# Patient Record
Sex: FEMALE | Race: WHITE | Employment: OTHER | URBAN - METROPOLITAN AREA
[De-identification: names, ages, dates, MRNs, and addresses within clinical notes are randomized per-mention and may not be internally consistent; named-entity substitution may affect disease eponyms.]

---

## 2017-05-18 ENCOUNTER — HOSPITAL ENCOUNTER (OUTPATIENT)
Dept: MAMMOGRAPHY | Age: 77
Discharge: HOME | End: 2017-05-18
Attending: OBSTETRICS & GYNECOLOGY
Payer: MEDICARE

## 2017-05-18 DIAGNOSIS — Z12.31 VISIT FOR SCREENING MAMMOGRAM: ICD-10-CM

## 2017-05-18 PROCEDURE — 77067 SCR MAMMO BI INCL CAD: CPT

## 2017-05-24 ENCOUNTER — HOSPITAL ENCOUNTER (OUTPATIENT)
Dept: MAMMOGRAPHY | Age: 77
Discharge: HOME | End: 2017-05-24
Attending: OBSTETRICS & GYNECOLOGY
Payer: MEDICARE

## 2017-05-24 ENCOUNTER — HOSPITAL ENCOUNTER (OUTPATIENT)
Dept: ULTRASOUND IMAGING | Age: 77
Discharge: HOME | End: 2017-05-24
Attending: OBSTETRICS & GYNECOLOGY
Payer: MEDICARE

## 2017-05-24 DIAGNOSIS — R92.8 ABNORMAL MAMMOGRAM: ICD-10-CM

## 2017-05-24 PROCEDURE — 76642 ULTRASOUND BREAST LIMITED: CPT

## 2017-05-24 PROCEDURE — 77066 DX MAMMO INCL CAD BI: CPT

## 2018-07-20 ENCOUNTER — HOSPITAL ENCOUNTER (OUTPATIENT)
Dept: MAMMOGRAPHY | Age: 78
Discharge: HOME | End: 2018-07-20
Attending: OBSTETRICS & GYNECOLOGY
Payer: MEDICARE

## 2018-07-20 DIAGNOSIS — Z12.39 SCREENING BREAST EXAMINATION: ICD-10-CM

## 2018-07-20 PROCEDURE — 77067 SCR MAMMO BI INCL CAD: CPT

## 2019-12-10 ENCOUNTER — OFFICE VISIT (OUTPATIENT)
Dept: FAMILY MEDICINE CLINIC | Age: 79
End: 2019-12-10

## 2019-12-10 VITALS
HEART RATE: 67 BPM | OXYGEN SATURATION: 96 % | BODY MASS INDEX: 28.05 KG/M2 | DIASTOLIC BLOOD PRESSURE: 72 MMHG | TEMPERATURE: 97.9 F | SYSTOLIC BLOOD PRESSURE: 132 MMHG | HEIGHT: 62 IN | WEIGHT: 152.4 LBS | RESPIRATION RATE: 16 BRPM

## 2019-12-10 DIAGNOSIS — I10 ESSENTIAL HYPERTENSION: Primary | ICD-10-CM

## 2019-12-10 DIAGNOSIS — F02.80 ALZHEIMER'S DEMENTIA WITHOUT BEHAVIORAL DISTURBANCE, UNSPECIFIED TIMING OF DEMENTIA ONSET: ICD-10-CM

## 2019-12-10 DIAGNOSIS — G30.9 ALZHEIMER'S DEMENTIA WITHOUT BEHAVIORAL DISTURBANCE, UNSPECIFIED TIMING OF DEMENTIA ONSET: ICD-10-CM

## 2019-12-10 DIAGNOSIS — E78.2 MIXED HYPERLIPIDEMIA: ICD-10-CM

## 2019-12-10 RX ORDER — DONEPEZIL HYDROCHLORIDE 10 MG/1
TABLET, FILM COATED ORAL
COMMUNITY
Start: 2019-12-08

## 2019-12-10 RX ORDER — MEMANTINE HYDROCHLORIDE 10 MG/1
TABLET ORAL
Refills: 1 | COMMUNITY
Start: 2019-11-14

## 2019-12-10 NOTE — PROGRESS NOTES
Subjective:     Chief Complaint   Patient presents with   [address] [facility] patient        She  is a 67 y.o. female who presents for evaluation of:  New pt to [facility] care. [facility]. PMH significant for HTN, HLD, recently diagnosed Alzheimer's dementia, GERD, OA status post right TKR. Hyperlipidemia & HTN  Pt is doing well on current meds with no medication side effects noted  No new myalgias, no joint pains, no weakness  No TIA's, no chest pain on exertion, no dyspnea on exertion, no swelling of ankles. Exercising - Minimal  Dieting -no  Smoking - No     No results found for: CHOL, CHOLX, CHLST, CHOLV, HDL, LDL, LDLC, DLDLP, TGLX, TRIGL, TRIGP    ROS  Gen - no fever/chills  Resp - no dyspnea or cough  CV - no chest pain or GAXIOLA  Neuro - recently dx with Dementia. Seeing Neuro for this. Rest per HPI    Past Medical History:   Diagnosis Date    Arthritis     hands and knees    Community acquired pneumonia     Dementia ([facility] [address]     Dyspepsia and other specified disorders of function of stomach     GERD (gastroesophageal reflux disease)     Hypercholesterolemia     Hypertension      Past Surgical History:   Procedure Laterality Date    HX BACK SURGERY      HX CATARACT REMOVAL      cataract surgery    HX GYN      hysterectomy    HX GYN      2 vaginal deliveries    HX MOHS PROCEDURES      right    HX ORTHOPAEDIC  4/20/15    Right total knee arthroplasty     Current Outpatient Medications on File Prior to Visit   Medication Sig Dispense Refill    memantine (NAMENDA) 10 mg tablet TK 1 T PO BID  1    donepezil (ARICEPT) 10 mg tablet       acetaminophen (TYLENOL) 325 mg tablet Take 2 Tabs by mouth every six (6) hours. 240 Tab 0    Hydrochlorothiazide 12.5 mg tablet Take 12.5 mg by mouth every morning.  omeprazole (PRILOSEC) 20 mg capsule 20 mg every morning.  pravastatin (PRAVACHOL) 40 mg tablet Take 40 mg by mouth nightly.       [DISCONTINUED] oxyCODONE IR (ROXICODONE) 5 mg immediate release tablet Take 1-2 Tabs by mouth every three (3) hours as needed for Pain. Max Daily Amount: 80 mg. 80 Tab 0    L GASSERI/B BIFIDUM/B LONGUM (Aubrey PO) Take  by mouth.  [DISCONTINUED] multivitamin (ONE A DAY) tablet Take 1 Tab by mouth daily. No current facility-administered medications on file prior to visit. Objective:     Vitals:    12/10/19 1411   BP: 132/72   Pulse: 67   Resp: 16   Temp: 97.9 °F (36.6 °C)   TempSrc: Oral   SpO2: 96%   Weight: 152 lb 6.4 oz (69.1 kg)   Height: 5' 2\" (1.575 m)     Physical Examination:  General appearance - alert, well appearing, and in no distress  Eyes -sclera anicteric  Neck - supple, no significant adenopathy, no thyromegaly  Chest - clear to auscultation, no wheezes, rales or rhonchi, symmetric air entry  Heart - normal rate, regular rhythm, normal S1, S2, no murmurs, rubs, clicks or gallops  Neurological - alert, oriented, no focal findings or movement disorder noted  Extremitiesno edema  Psychnormal mood and affect    Assessment/ Plan:   Diagnoses and all orders for this visit:    1. Essential hypertensioncontrolled    2. Mixed hyperlipidemiahas been stable, continue pravastatin    3. Alzheimer's dementia without behavioral disturbance, unspecified timing of dementia onset (HCC)recently diagnosed by neurology. Patient's daughter reports having significant blood work and head CT done about 2 months ago. Requesting records for review and may consider checking additional labs if needed. I have discussed the diagnosis with the patient and the intended plan as seen in the above orders. The patient has received an after-visit summary and questions were answered concerning future plans. I have discussed medication side effects and warnings with the patient as well. The patient verbalizes understanding and agreement with the plan.     Follow-up and Dispositions    · Return in about 3 months (around 3/10/2020), or if symptoms worsen or fail to improve.

## 2020-03-10 ENCOUNTER — OFFICE VISIT (OUTPATIENT)
Dept: FAMILY MEDICINE CLINIC | Age: 80
End: 2020-03-10

## 2020-03-10 ENCOUNTER — HOSPITAL ENCOUNTER (OUTPATIENT)
Dept: GENERAL RADIOLOGY | Age: 80
Discharge: HOME | End: 2020-03-10
Payer: MEDICARE

## 2020-03-10 ENCOUNTER — HOSPITAL ENCOUNTER (OUTPATIENT)
Dept: LAB | Age: 80
Discharge: HOME | End: 2020-03-10
Payer: MEDICARE

## 2020-03-10 VITALS
HEIGHT: 62 IN | OXYGEN SATURATION: 95 % | TEMPERATURE: 98.8 F | DIASTOLIC BLOOD PRESSURE: 84 MMHG | HEART RATE: 67 BPM | BODY MASS INDEX: 26.65 KG/M2 | RESPIRATION RATE: 16 BRPM | SYSTOLIC BLOOD PRESSURE: 128 MMHG | WEIGHT: 144.8 LBS

## 2020-03-10 DIAGNOSIS — Z13.820 SCREENING FOR OSTEOPOROSIS: ICD-10-CM

## 2020-03-10 DIAGNOSIS — Z00.00 MEDICARE ANNUAL WELLNESS VISIT, SUBSEQUENT: Primary | ICD-10-CM

## 2020-03-10 DIAGNOSIS — M81.0 POSTMENOPAUSAL BONE LOSS: ICD-10-CM

## 2020-03-10 DIAGNOSIS — R10.84 GENERALIZED ABDOMINAL PAIN: ICD-10-CM

## 2020-03-10 DIAGNOSIS — K59.01 SLOW TRANSIT CONSTIPATION: ICD-10-CM

## 2020-03-10 DIAGNOSIS — Z79.899 ENCOUNTER FOR LONG-TERM (CURRENT) USE OF MEDICATIONS: ICD-10-CM

## 2020-03-10 DIAGNOSIS — E78.2 MIXED HYPERLIPIDEMIA: ICD-10-CM

## 2020-03-10 DIAGNOSIS — F32.9 CURRENT EPISODE OF MAJOR DEPRESSIVE DISORDER WITHOUT PRIOR EPISODE, UNSPECIFIED DEPRESSION EPISODE SEVERITY: ICD-10-CM

## 2020-03-10 DIAGNOSIS — R63.4 WEIGHT LOSS: ICD-10-CM

## 2020-03-10 DIAGNOSIS — G30.9 ALZHEIMER'S DEMENTIA WITHOUT BEHAVIORAL DISTURBANCE, UNSPECIFIED TIMING OF DEMENTIA ONSET: ICD-10-CM

## 2020-03-10 DIAGNOSIS — I10 ESSENTIAL HYPERTENSION: ICD-10-CM

## 2020-03-10 DIAGNOSIS — F02.80 ALZHEIMER'S DEMENTIA WITHOUT BEHAVIORAL DISTURBANCE, UNSPECIFIED TIMING OF DEMENTIA ONSET: ICD-10-CM

## 2020-03-10 PROCEDURE — 81001 URINALYSIS AUTO W/SCOPE: CPT

## 2020-03-10 PROCEDURE — 83036 HEMOGLOBIN GLYCOSYLATED A1C: CPT

## 2020-03-10 PROCEDURE — 84443 ASSAY THYROID STIM HORMONE: CPT

## 2020-03-10 PROCEDURE — 36415 COLL VENOUS BLD VENIPUNCTURE: CPT

## 2020-03-10 PROCEDURE — 80053 COMPREHEN METABOLIC PANEL: CPT

## 2020-03-10 PROCEDURE — 82607 VITAMIN B-12: CPT

## 2020-03-10 PROCEDURE — 74018 RADEX ABDOMEN 1 VIEW: CPT

## 2020-03-10 PROCEDURE — 85027 COMPLETE CBC AUTOMATED: CPT

## 2020-03-10 PROCEDURE — 80061 LIPID PANEL: CPT

## 2020-03-10 RX ORDER — PREDNISONE 20 MG/1
40 TABLET ORAL
COMMUNITY
Start: 2020-02-16 | End: 2020-03-10 | Stop reason: ALTCHOICE

## 2020-03-10 RX ORDER — MIRTAZAPINE 7.5 MG/1
7.5 TABLET, FILM COATED ORAL
Qty: 30 TAB | Refills: 1 | Status: SHIPPED | OUTPATIENT
Start: 2020-03-10 | End: 2020-04-07

## 2020-03-10 RX ORDER — OMEPRAZOLE 20 MG/1
20 CAPSULE, DELAYED RELEASE ORAL
COMMUNITY
End: 2020-03-10 | Stop reason: SDUPTHER

## 2020-03-10 RX ORDER — ALBUTEROL SULFATE 90 UG/1
AEROSOL, METERED RESPIRATORY (INHALATION)
COMMUNITY
Start: 2020-02-16

## 2020-03-10 NOTE — PROGRESS NOTES
Chief Complaint   Patient presents with   [facility] Annual Wellness Visit     Medicare   1. Have you been to the ER, urgent care clinic since your last visit? Hospitalized since your last visit? Yes [facility] ER 3 weeks ago    2. Have you seen or consulted any other health care providers outside of the [address] [place] [address] since your last visit? Include any pap smears or colon screening.  No     Discuss Constipation and Poor appetite  Patient denies abdominal pain but daughter states she has been having problem   Used Lactulose from  medication with no results

## 2020-03-10 NOTE — PROGRESS NOTES
Subjective:     Chief Complaint   Patient presents with    Annual Wellness Visit     Medicare        She  is a 79 y.o. female who presents for evaluation of:  Newer pt. Since last appt,  passed away unexpectedly. After this her brother  as well. Prior to all of this her son  unexpectedly. She feels like she is grieving well. She has moved in with her daughter and son in law who help care for her. Her daughter is much more worried about her. Upon questioning, patient does endorse decreased appetite with an 8 pound weight loss over the last few months. She also endorses decreased sleep. Her daughter decreased energy and a little bit more trouble with concentration and attention. Patient denies any suicidal ideation or homicidal ideation. Other main concern is for abdominal pain and constipation. Patient reports having BMs about 1 time per week. Daughter gave her some lactulose over the weekend but did not seem to produce a BM. Of note, patient has a significantly decreased appetite and is not eating or drinking nearly as much as she was in the past.    She was seen at [facility] ER on 2020 for bronchitis as well. She had a neg flu swab at the time. Dementia sx are slightly progressing. Seeing Dr. Osmel Wall for this. On Aricept and Namenda. Hyperlipidemia & HTN  Pt is doing well on current meds with no medication side effects noted  No new myalgias, no joint pains, no weakness  No TIA's, no chest pain on exertion, no dyspnea on exertion, no swelling of ankles.   Exercising - Minimal     No results found for: CHOL, CHOLX, CHLST, CHOLV, HDL, LDL, LDLC, DLDLP, TGLX, TRIGL, TRIGP    ROS  Gen - no fever/chills  Resp - no dyspnea or cough  CV - no chest pain or GAXIOLA  GI - contipation as above  Neuro - per HPI  Rest per HPI    Past Medical History:   Diagnosis Date    Arthritis     hands and knees    Community acquired pneumonia     Dementia ([facility] [address]     Dyspepsia and other specified disorders of function of stomach     GERD (gastroesophageal reflux disease)     Hypercholesterolemia     Hypertension      Past Surgical History:   Procedure Laterality Date    HX BACK SURGERY      HX CATARACT REMOVAL      cataract surgery    HX GYN      hysterectomy    HX GYN      2 vaginal deliveries    HX MOHS PROCEDURES      right    HX ORTHOPAEDIC  4/20/15    Right total knee arthroplasty     Current Outpatient Medications on File Prior to Visit   Medication Sig Dispense Refill    VENTOLIN HFA 90 mcg/actuation inhaler INL 2 PFS PO Q 4 H PRF WHZ      memantine (NAMENDA) 10 mg tablet TK 1 T PO BID  1    donepezil (ARICEPT) 10 mg tablet       acetaminophen (TYLENOL) 325 mg tablet Take 2 Tabs by mouth every six (6) hours. 240 Tab 0    Hydrochlorothiazide 12.5 mg tablet Take 12.5 mg by mouth every morning.  omeprazole (PRILOSEC) 20 mg capsule 20 mg every morning.  pravastatin (PRAVACHOL) 40 mg tablet Take 40 mg by mouth nightly.  [DISCONTINUED] predniSONE (DELTASONE) 20 mg tablet 40 mg.      [DISCONTINUED] omeprazole (PRILOSEC) 20 mg capsule 20 mg.      L GASSERI/B BIFIDUM/B LONGUM (Plixi PO) Take  by mouth. No current facility-administered medications on file prior to visit.          Objective:     Vitals:    03/10/20 1352   BP: 128/84   Pulse: 67   Resp: 16   Temp: 98.8 °F (37.1 °C)   TempSrc: Oral   SpO2: 95%   Weight: 144 lb 12.8 oz (65.7 kg)   Height: 5' 2\" (1.575 m)     Physical Examination:  General appearance - alert, well appearing, and in no distress  Eyes -sclera anicteric  Neck - supple, no significant adenopathy, no thyromegaly  Chest - clear to auscultation, no wheezes, rales or rhonchi, symmetric air entry  Heart - normal rate, regular rhythm, normal S1, S2, no murmurs, rubs, clicks or gallops  Abdomensoft, nontender, nondistended, no rebound or guarding  Neurological - alert, oriented, no focal findings or movement disorder noted  Extremitiesno edema  Psychnormal mood and affect    Assessment/ Plan:   Diagnoses and all orders for this visit:    1. Medicare annual wellness visit, subsequent    2. Essential hypertensionstable, may be able to pull away from HCTZ  -     METABOLIC PANEL, COMPREHENSIVE  -     URINALYSIS W/ RFLX MICROSCOPIC    3. Mixed hyperlipidemiastable, continues on pravastatin  -     HEMOGLOBIN A1C WITH EAG  -     LIPID PANEL  -     METABOLIC PANEL, COMPREHENSIVE  -     TSH 3RD GENERATION    4. Alzheimer's dementia without behavioral disturbance, unspecified timing of dementia onset ([facility] [address]- seeing Dr. Jaylen Lowe for this  -     TSH 3RD GENERATION  -     VITAMIN B12 & FOLATE    5. Current episode of major depressive disorder without prior episode, unspecified depression episode severitynewer issue and related to grieving. Starting Remeron and will monitor symptoms  -     TSH 3RD GENERATION  -     VITAMIN B12 & FOLATE  -     mirtazapine (REMERON) 7.5 mg tablet; Take 1 Tab by mouth nightly. 6. Weight losssuspect this is related to depression and grieving as above. Starting Remeron. Checking labs and may need further work-up at next appointment if she continues losing weight  -     HEMOGLOBIN A1C WITH EAG  -     TSH 3RD GENERATION  -     CBC W/O DIFF  -     mirtazapine (REMERON) 7.5 mg tablet; Take 1 Tab by mouth nightly. 7. Generalized abdominal pain  8. Slow transit constipation   KUB and labs. Encouraged water and fiber intake  -     XR ABD (KUB); Future  -     METABOLIC PANEL, COMPREHENSIVE  -     CBC W/O DIFF  -     URINALYSIS W/ RFLX MICROSCOPIC    9. Encounter for long-term (current) use of medications  -     HEMOGLOBIN A1C WITH EAG  -     LIPID PANEL  -     METABOLIC PANEL, COMPREHENSIVE  -     TSH 3RD GENERATION  -     CBC W/O DIFF  -     URINALYSIS W/ RFLX MICROSCOPIC  -     VITAMIN B12 & FOLATE    I have discussed the diagnosis with the patient and the intended plan as seen in the above orders.   The patient has received an after-visit summary and questions were answered concerning future plans. I have discussed medication side effects and warnings with the patient as well. The patient verbalizes understanding and agreement with the plan. Follow-up and Dispositions    · Return in about 3 months (around 6/10/2020). This is the Subsequent Medicare Annual Wellness Exam, performed 12 months or more after the Initial AWV or the last Subsequent AWV    I have reviewed the patient's medical history in detail and updated the computerized patient record. History     Patient Active Problem List   Diagnosis Code    Skin lesion L98.9    Primary localized osteoarthrosis, lower leg M17.10     Past Medical History:   Diagnosis Date    Arthritis     hands and knees    Community acquired pneumonia     Dementia ([facility] [address]     Dyspepsia and other specified disorders of function of stomach     GERD (gastroesophageal reflux disease)     Hypercholesterolemia     Hypertension       Past Surgical History:   Procedure Laterality Date    HX BACK SURGERY      HX CATARACT REMOVAL      cataract surgery    HX GYN      hysterectomy    HX GYN      2 vaginal deliveries    HX MOHS PROCEDURES      right    HX ORTHOPAEDIC  4/20/15    Right total knee arthroplasty     Current Outpatient Medications   Medication Sig Dispense Refill    VENTOLIN HFA 90 mcg/actuation inhaler INL 2 PFS PO Q 4 H PRF WHZ      mirtazapine (REMERON) 7.5 mg tablet Take 1 Tab by mouth nightly. 30 Tab 1    memantine (NAMENDA) 10 mg tablet TK 1 T PO BID  1    donepezil (ARICEPT) 10 mg tablet       acetaminophen (TYLENOL) 325 mg tablet Take 2 Tabs by mouth every six (6) hours. 240 Tab 0    Hydrochlorothiazide 12.5 mg tablet Take 12.5 mg by mouth every morning.  omeprazole (PRILOSEC) 20 mg capsule 20 mg every morning.  pravastatin (PRAVACHOL) 40 mg tablet Take 40 mg by mouth nightly.       L GASSERI/B BIFIDUM/B LONGUM ([facility] PO) Take  by mouth. No Known Allergies    Family History   Problem Relation Age of Onset    Breast Cancer Maternal Aunt     Cancer Sister         Kidney    COPD Brother      Social History     Tobacco Use    Smoking status: Never Smoker    Smokeless tobacco: Never Used   Substance Use Topics    Alcohol use: No       Depression Risk Factor Screening:     3 most recent PHQ Screens 3/10/2020   Little interest or pleasure in doing things Several days   Feeling down, depressed, irritable, or hopeless Several days   Total Score PHQ 2 2       Alcohol Risk Factor Screening:   Do you average 1 drink per night or more than 7 drinks a week:  No    On any one occasion in the past three months have you have had more than 3 drinks containing alcohol:  No      Functional Ability and Level of Safety:   Hearing: Hearing is good. Activities of Daily Living: The home contains: no safety equipment. Patient needs help with:  transportation, shopping, preparing meals, laundry, housework, managing medications and managing money    Ambulation: with no difficulty    Fall Risk:  Fall Risk Assessment, last 12 mths 3/10/2020   Able to walk? Yes   Fall in past 12 months? No       Abuse Screen:  Patient is not abused    Cognitive Screening   Has your family/caregiver stated any concerns about your memory: Has known dementia and is being followed by neurology so no additional cognitive testing was performed today. Patient Care Team   Patient Care Team:  Gurvinder Sigala MD as PCP - General (Family Practice)  Gurvinder Sigala MD as PCP - REHABILITATION [facility] Provider  Mukund Moreland MD (Neurology)    Assessment/Plan   Education and counseling provided:  Are appropriate based on today's review and evaluation    Diagnoses and all orders for this visit:    1. Medicare annual wellness visit, subsequent    2. Essential hypertension  -     METABOLIC PANEL, COMPREHENSIVE  -     URINALYSIS W/ RFLX MICROSCOPIC    3.  Mixed hyperlipidemia  -     HEMOGLOBIN A1C WITH EAG  -     LIPID PANEL  -     METABOLIC PANEL, COMPREHENSIVE  -     TSH 3RD GENERATION    4. Alzheimer's dementia without behavioral disturbance, unspecified timing of dementia onset ([facility] [address]  -     TSH 3RD GENERATION  -     VITAMIN B12 & FOLATE    5. Current episode of major depressive disorder without prior episode, unspecified depression episode severity  -     TSH 3RD GENERATION  -     VITAMIN B12 & FOLATE  -     mirtazapine (REMERON) 7.5 mg tablet; Take 1 Tab by mouth nightly. 6. Weight loss  -     HEMOGLOBIN A1C WITH EAG  -     TSH 3RD GENERATION  -     CBC W/O DIFF  -     mirtazapine (REMERON) 7.5 mg tablet; Take 1 Tab by mouth nightly. 7. Generalized abdominal pain  -     XR ABD (KUB); Future  -     METABOLIC PANEL, COMPREHENSIVE  -     CBC W/O DIFF  -     URINALYSIS W/ RFLX MICROSCOPIC    8. Slow transit constipation  -     XR ABD (KUB); Future    9. Encounter for long-term (current) use of medications  -     HEMOGLOBIN A1C WITH EAG  -     LIPID PANEL  -     METABOLIC PANEL, COMPREHENSIVE  -     TSH 3RD GENERATION  -     CBC W/O DIFF  -     URINALYSIS W/ RFLX MICROSCOPIC  -     VITAMIN B12 & FOLATE    10. Screening for osteoporosis  -     DEXA BONE DENSITY STUDY AXIAL; Future    11. Postmenopausal bone loss  -     DEXA BONE DENSITY STUDY AXIAL;  Future        Health Maintenance Due   Topic Date Due    Lipid Screen  01/20/1950    DTaP/Tdap/Td series (1 - Tdap) 01/20/1952    GLAUCOMA SCREENING Q2Y  01/20/2006    Bone Densitometry (Dexa) Screening  01/20/2006    Shingrix Vaccine Age 50> (2 of 2) 12/02/2019

## 2020-03-10 NOTE — PATIENT INSTRUCTIONS
Medicare Wellness Visit, Female The best way to live healthy is to have a lifestyle where you eat a well-balanced diet, exercise regularly, limit alcohol use, and quit all forms of tobacco/nicotine, if applicable. Regular preventive services are another way to keep healthy. Preventive services (vaccines, screening tests, monitoring & exams) can help personalize your care plan, which helps you manage your own care. Screening tests can find health problems at the earliest stages, when they are easiest to treat. [facility] follows the current, evidence-based guidelines published by the [facility] Jimy Gross ([facility]) when recommending preventive services for our patients. Because we follow these guidelines, sometimes recommendations change over time as research supports it. (For example, mammograms used to be recommended annually. Even though Medicare will still pay for an annual mammogram, the newer guidelines recommend a mammogram every two years for women of average risk). Of course, you and your doctor may decide to screen more often for some diseases, based on your risk and your co-morbidities (chronic disease you are already diagnosed with). Preventive services for you include: - Medicare offers their members a free annual wellness visit, which is time for you and your primary care provider to discuss and plan for your preventive service needs. Take advantage of this benefit every year! 
-All adults over the age of 72 should receive the recommended pneumonia vaccines. Current USPSTF guidelines recommend a series of two vaccines for the best pneumonia protection.  
-All adults should have a flu vaccine yearly and a tetanus vaccine every 10 years.  
-All adults age 48 and older should receive the shingles vaccines (series of two vaccines). -All adults age 38-68 who are overweight should have a diabetes screening test once every three years. -All adults born between [birth year] and 1964 should be screened once for Hepatitis C. 
-Other screening tests and preventive services for persons with diabetes include: an eye exam to screen for diabetic retinopathy, a kidney function test, a foot exam, and stricter control over your cholesterol.  
-Cardiovascular screening for adults with routine risk involves an electrocardiogram (ECG) at intervals determined by your doctor.  
-Colorectal cancer screenings should be done for adults age 54-65 with no increased risk factors for colorectal cancer. There are a number of acceptable methods of screening for this type of cancer. Each test has its own benefits and drawbacks. Discuss with your doctor what is most appropriate for you during your annual wellness visit. The different tests include: colonoscopy (considered the best screening method), a fecal occult blood test, a fecal DNA test, and sigmoidoscopy. 
 
-A bone mass density test is recommended when a woman turns 65 to screen for osteoporosis. This test is only recommended one time, as a screening. Some providers will use this same test as a disease monitoring tool if you already have osteoporosis. -Breast cancer screenings are recommended every other year for women of normal risk, age 54-69. 
-Cervical cancer screenings for women over age 72 are only recommended with certain risk factors. Here is a list of your current Health Maintenance items (your personalized list of preventive services) with a due date: 
Health Maintenance Due Topic Date Due  Cholesterol Test   01/20/1950  
 DTaP/Tdap/Td  (1 - Tdap) 01[birth year]20/1952  Glaucoma Screening   01/20/2006  Bone Mineral Density   01/20/2006 [facility] Annual Well Visit  10/12/2018  Shingles Vaccine (2 of 2) 12/02/2019

## 2020-03-11 LAB
ALBUMIN SERPL-MCNC: 4.4 G/DL (ref 3.7–4.7)
ALBUMIN/GLOB SERPL: 1.8 {RATIO} (ref 1.2–2.2)
ALP SERPL-CCNC: 67 IU/L (ref 39–117)
ALT SERPL-CCNC: 15 IU/L (ref 0–32)
APPEARANCE UR: CLEAR
AST SERPL-CCNC: 23 IU/L (ref 0–40)
BACTERIA #/AREA URNS HPF: ABNORMAL /[HPF]
BILIRUB SERPL-MCNC: 0.5 MG/DL (ref 0–1.2)
BILIRUB UR QL STRIP: NEGATIVE
BUN SERPL-MCNC: 14 MG/DL (ref 8–27)
BUN/CREAT SERPL: 19 (ref 12–28)
CALCIUM SERPL-MCNC: 9.5 MG/DL (ref 8.7–10.3)
CASTS URNS QL MICRO: ABNORMAL /LPF
CHLORIDE SERPL-SCNC: 100 MMOL/L (ref 96–106)
CHOLEST SERPL-MCNC: 150 MG/DL (ref 100–199)
CO2 SERPL-SCNC: 25 MMOL/L (ref 20–29)
COLOR UR: YELLOW
CREAT SERPL-MCNC: 0.74 MG/DL (ref 0.57–1)
CRYSTALS URNS MICRO: ABNORMAL
EPI CELLS #/AREA URNS HPF: ABNORMAL /HPF (ref 0–10)
ERYTHROCYTE [DISTWIDTH] IN BLOOD BY AUTOMATED COUNT: 12.6 % (ref 11.7–15.4)
EST. AVERAGE GLUCOSE BLD GHB EST-MCNC: 117 MG/DL
FOLATE SERPL-MCNC: 18.2 NG/ML
GLOBULIN SER CALC-MCNC: 2.4 G/DL (ref 1.5–4.5)
GLUCOSE SERPL-MCNC: 96 MG/DL (ref 65–99)
GLUCOSE UR QL: NEGATIVE
HBA1C MFR BLD: 5.7 % (ref 4.8–5.6)
HCT VFR BLD AUTO: 42.9 % (ref 34–46.6)
HDLC SERPL-MCNC: 51 MG/DL
HGB BLD-MCNC: 14.7 G/DL (ref 11.1–15.9)
HGB UR QL STRIP: NEGATIVE
KETONES UR QL STRIP: ABNORMAL
LDLC SERPL CALC-MCNC: 77 MG/DL (ref 0–99)
LEUKOCYTE ESTERASE UR QL STRIP: ABNORMAL
MCH RBC QN AUTO: 30.5 PG (ref 26.6–33)
MCHC RBC AUTO-ENTMCNC: 34.3 G/DL (ref 31.5–35.7)
MCV RBC AUTO: 89 FL (ref 79–97)
MICRO URNS: ABNORMAL
MUCOUS THREADS URNS QL MICRO: PRESENT
NITRITE UR QL STRIP: NEGATIVE
PH UR STRIP: 6.5 [PH] (ref 5–7.5)
PLATELET # BLD AUTO: 172 X10E3/UL (ref 150–450)
POTASSIUM SERPL-SCNC: 3.5 MMOL/L (ref 3.5–5.2)
PROT SERPL-MCNC: 6.8 G/DL (ref 6–8.5)
PROT UR QL STRIP: ABNORMAL
RBC # BLD AUTO: 4.82 X10E6/UL (ref 3.77–5.28)
RBC #/AREA URNS HPF: ABNORMAL /HPF (ref 0–2)
SODIUM SERPL-SCNC: 142 MMOL/L (ref 134–144)
SP GR UR: >=1.03 (ref 1–1.03)
TRIGL SERPL-MCNC: 109 MG/DL (ref 0–149)
TSH SERPL DL<=0.005 MIU/L-ACNC: 0.92 UIU/ML (ref 0.45–4.5)
UNIDENT CRYS URNS QL MICRO: PRESENT
UROBILINOGEN UR STRIP-MCNC: 1 MG/DL (ref 0.2–1)
VIT B12 SERPL-MCNC: 628 PG/ML (ref 232–1245)
VLDLC SERPL CALC-MCNC: 22 MG/DL (ref 5–40)
WBC # BLD AUTO: 8.1 X10E3/UL (ref 3.4–10.8)
WBC #/AREA URNS HPF: ABNORMAL /HPF (ref 0–5)

## 2020-04-07 DIAGNOSIS — R63.4 WEIGHT LOSS: ICD-10-CM

## 2020-04-07 DIAGNOSIS — F32.9 CURRENT EPISODE OF MAJOR DEPRESSIVE DISORDER WITHOUT PRIOR EPISODE, UNSPECIFIED DEPRESSION EPISODE SEVERITY: ICD-10-CM

## 2020-04-07 RX ORDER — MIRTAZAPINE 7.5 MG/1
TABLET, FILM COATED ORAL
Qty: 30 TAB | Refills: 0 | Status: SHIPPED | OUTPATIENT
Start: 2020-04-07 | End: 2020-05-05

## 2020-05-04 DIAGNOSIS — R63.4 WEIGHT LOSS: ICD-10-CM

## 2020-05-04 DIAGNOSIS — F32.9 CURRENT EPISODE OF MAJOR DEPRESSIVE DISORDER WITHOUT PRIOR EPISODE, UNSPECIFIED DEPRESSION EPISODE SEVERITY: ICD-10-CM

## 2020-05-04 NOTE — LETTER
5/5/2020 12:41 PM 
 
Ms. LILLIANA Pittman 74 [address] [address] Dear Ms. Clemens: 
 
We've missed you! Please call our office at [phone] and schedule a follow up appointment for your continued care. Please call to schedule virtual visit follow-up with Dr Vi Tejada.  
 
 
 
Sincerely, 
 
 
Bossman Day MD

## 2020-05-05 RX ORDER — MIRTAZAPINE 7.5 MG/1
TABLET, FILM COATED ORAL
Qty: 30 TAB | Refills: 0 | Status: SHIPPED | OUTPATIENT
Start: 2020-05-05

## 2023-05-11 RX ORDER — DONEPEZIL HYDROCHLORIDE 10 MG/1
TABLET, FILM COATED ORAL
COMMUNITY
Start: 2019-12-08

## 2023-05-11 RX ORDER — MEMANTINE HYDROCHLORIDE 10 MG/1
1 TABLET ORAL 2 TIMES DAILY
COMMUNITY
Start: 2019-11-14

## 2023-05-11 RX ORDER — MIRTAZAPINE 7.5 MG/1
1 TABLET, FILM COATED ORAL NIGHTLY
COMMUNITY
Start: 2020-05-05

## 2023-05-11 RX ORDER — PRAVASTATIN SODIUM 40 MG
40 TABLET ORAL NIGHTLY
COMMUNITY

## 2023-05-11 RX ORDER — ALBUTEROL SULFATE 90 UG/1
AEROSOL, METERED RESPIRATORY (INHALATION)
COMMUNITY
Start: 2020-02-16

## 2023-05-11 RX ORDER — HYDROCHLOROTHIAZIDE 12.5 MG/1
TABLET ORAL
COMMUNITY

## 2023-05-11 RX ORDER — OMEPRAZOLE 20 MG/1
CAPSULE, DELAYED RELEASE ORAL
COMMUNITY
Start: 2011-05-17

## 2023-05-11 RX ORDER — ACETAMINOPHEN 325 MG/1
TABLET ORAL EVERY 6 HOURS
COMMUNITY
Start: 2015-04-22